# Patient Record
Sex: FEMALE | HISPANIC OR LATINO | Employment: UNEMPLOYED | URBAN - METROPOLITAN AREA
[De-identification: names, ages, dates, MRNs, and addresses within clinical notes are randomized per-mention and may not be internally consistent; named-entity substitution may affect disease eponyms.]

---

## 2021-09-02 ENCOUNTER — APPOINTMENT (OUTPATIENT)
Dept: ULTRASOUND IMAGING | Facility: HOSPITAL | Age: 26
End: 2021-09-02

## 2021-09-02 ENCOUNTER — HOSPITAL ENCOUNTER (EMERGENCY)
Facility: HOSPITAL | Age: 26
Discharge: HOME | End: 2021-09-02
Attending: EMERGENCY MEDICINE | Admitting: EMERGENCY MEDICINE

## 2021-09-02 VITALS
TEMPERATURE: 99 F | DIASTOLIC BLOOD PRESSURE: 65 MMHG | BODY MASS INDEX: 27.38 KG/M2 | OXYGEN SATURATION: 99 % | HEIGHT: 62 IN | RESPIRATION RATE: 16 BRPM | WEIGHT: 148.81 LBS | SYSTOLIC BLOOD PRESSURE: 104 MMHG | HEART RATE: 79 BPM

## 2021-09-02 DIAGNOSIS — O46.90: ICD-10-CM

## 2021-09-02 DIAGNOSIS — Z34.90 INTRAUTERINE PREGNANCY: Primary | ICD-10-CM

## 2021-09-02 LAB
ABO GROUP BLD: NORMAL
ALBUMIN SERPL-MCNC: 4.1 G/DL (ref 3.5–5.2)
ALBUMIN/GLOB SERPL: 1.3 G/DL
ALP SERPL-CCNC: 72 U/L (ref 39–117)
ALT SERPL W P-5'-P-CCNC: 7 U/L (ref 1–33)
ANION GAP SERPL CALCULATED.3IONS-SCNC: 9.7 MMOL/L (ref 5–15)
AST SERPL-CCNC: 10 U/L (ref 1–32)
BACTERIA UR QL AUTO: ABNORMAL /HPF
BASOPHILS # BLD AUTO: 0.04 10*3/MM3 (ref 0–0.2)
BASOPHILS NFR BLD AUTO: 0.4 % (ref 0–1.5)
BILIRUB SERPL-MCNC: <0.2 MG/DL (ref 0–1.2)
BILIRUB UR QL STRIP: NEGATIVE
BUN SERPL-MCNC: 10 MG/DL (ref 6–20)
BUN/CREAT SERPL: 14.7 (ref 7–25)
CALCIUM SPEC-SCNC: 9.5 MG/DL (ref 8.6–10.5)
CHLORIDE SERPL-SCNC: 102 MMOL/L (ref 98–107)
CLARITY UR: ABNORMAL
CO2 SERPL-SCNC: 23.3 MMOL/L (ref 22–29)
COLOR UR: YELLOW
CREAT SERPL-MCNC: 0.68 MG/DL (ref 0.57–1)
DEPRECATED RDW RBC AUTO: 40 FL (ref 37–54)
EOSINOPHIL # BLD AUTO: 0.09 10*3/MM3 (ref 0–0.4)
EOSINOPHIL NFR BLD AUTO: 0.9 % (ref 0.3–6.2)
ERYTHROCYTE [DISTWIDTH] IN BLOOD BY AUTOMATED COUNT: 12.8 % (ref 12.3–15.4)
GFR SERPL CREATININE-BSD FRML MDRD: 105 ML/MIN/1.73
GFR SERPL CREATININE-BSD FRML MDRD: 128 ML/MIN/1.73
GLOBULIN UR ELPH-MCNC: 3.1 GM/DL
GLUCOSE SERPL-MCNC: 107 MG/DL (ref 65–99)
GLUCOSE UR STRIP-MCNC: NEGATIVE MG/DL
HCG INTACT+B SERPL-ACNC: NORMAL MIU/ML
HCT VFR BLD AUTO: 39.8 % (ref 34–46.6)
HGB BLD-MCNC: 13.5 G/DL (ref 12–15.9)
HGB UR QL STRIP.AUTO: ABNORMAL
HYALINE CASTS UR QL AUTO: ABNORMAL /LPF
IMM GRANULOCYTES # BLD AUTO: 0.03 10*3/MM3 (ref 0–0.05)
IMM GRANULOCYTES NFR BLD AUTO: 0.3 % (ref 0–0.5)
KETONES UR QL STRIP: NEGATIVE
LEUKOCYTE ESTERASE UR QL STRIP.AUTO: ABNORMAL
LYMPHOCYTES # BLD AUTO: 2.66 10*3/MM3 (ref 0.7–3.1)
LYMPHOCYTES NFR BLD AUTO: 25.9 % (ref 19.6–45.3)
MCH RBC QN AUTO: 29.2 PG (ref 26.6–33)
MCHC RBC AUTO-ENTMCNC: 33.9 G/DL (ref 31.5–35.7)
MCV RBC AUTO: 86.1 FL (ref 79–97)
MONOCYTES # BLD AUTO: 0.55 10*3/MM3 (ref 0.1–0.9)
MONOCYTES NFR BLD AUTO: 5.4 % (ref 5–12)
NEUTROPHILS NFR BLD AUTO: 6.91 10*3/MM3 (ref 1.7–7)
NEUTROPHILS NFR BLD AUTO: 67.1 % (ref 42.7–76)
NITRITE UR QL STRIP: NEGATIVE
NRBC BLD AUTO-RTO: 0 /100 WBC (ref 0–0.2)
PH UR STRIP.AUTO: 5.5 [PH] (ref 5–8)
PLATELET # BLD AUTO: 218 10*3/MM3 (ref 140–450)
PMV BLD AUTO: 10.8 FL (ref 6–12)
POTASSIUM SERPL-SCNC: 4 MMOL/L (ref 3.5–5.2)
PROT SERPL-MCNC: 7.2 G/DL (ref 6–8.5)
PROT UR QL STRIP: NEGATIVE
RBC # BLD AUTO: 4.62 10*6/MM3 (ref 3.77–5.28)
RBC # UR: ABNORMAL /HPF
REF LAB TEST METHOD: ABNORMAL
RH BLD: POSITIVE
SODIUM SERPL-SCNC: 135 MMOL/L (ref 136–145)
SP GR UR STRIP: 1.02 (ref 1–1.03)
SQUAMOUS #/AREA URNS HPF: ABNORMAL /HPF
UROBILINOGEN UR QL STRIP: ABNORMAL
WBC # BLD AUTO: 10.28 10*3/MM3 (ref 3.4–10.8)
WBC UR QL AUTO: ABNORMAL /HPF
YEAST URNS QL MICRO: ABNORMAL /HPF

## 2021-09-02 PROCEDURE — 80053 COMPREHEN METABOLIC PANEL: CPT | Performed by: PHYSICIAN ASSISTANT

## 2021-09-02 PROCEDURE — 84702 CHORIONIC GONADOTROPIN TEST: CPT | Performed by: PHYSICIAN ASSISTANT

## 2021-09-02 PROCEDURE — 87086 URINE CULTURE/COLONY COUNT: CPT | Performed by: PHYSICIAN ASSISTANT

## 2021-09-02 PROCEDURE — 81001 URINALYSIS AUTO W/SCOPE: CPT | Performed by: PHYSICIAN ASSISTANT

## 2021-09-02 PROCEDURE — 76817 TRANSVAGINAL US OBSTETRIC: CPT

## 2021-09-02 PROCEDURE — 99283 EMERGENCY DEPT VISIT LOW MDM: CPT

## 2021-09-02 PROCEDURE — 85025 COMPLETE CBC W/AUTO DIFF WBC: CPT | Performed by: PHYSICIAN ASSISTANT

## 2021-09-02 PROCEDURE — 86901 BLOOD TYPING SEROLOGIC RH(D): CPT | Performed by: PHYSICIAN ASSISTANT

## 2021-09-02 PROCEDURE — 86900 BLOOD TYPING SEROLOGIC ABO: CPT | Performed by: PHYSICIAN ASSISTANT

## 2021-09-02 RX ORDER — PRENATAL VIT NO.126/IRON/FOLIC 28MG-0.8MG
TABLET ORAL DAILY
COMMUNITY

## 2021-09-02 NOTE — ED PROVIDER NOTES
Time: 23:53 EDT  Arrived by: Car  Chief Complaint: Vaginal bleeding  History provided by: Patient (the patient's aunt translated via phone)    History of Present Illness:  Patient is a 26 y.o. year old female that presents to the emergency department with vaginal spotting that started yesterday and has been intermittent along with right lower quadrant pain.       used: No    Pregnancy Problem  The current episode started yesterday. The problem has been unchanged. The problem occurs 2 - 4 times per day. Episode is mild. Gestational age is approximately 7 weeks.   Primary symptoms include abdominal pain and vaginal bleeding. Prenatal care has been regular.   Vaginal bleeding was first noticed today. Vaginal bleeding other than menses is a new problem. Vaginal bleeding occurred 2 to 5 times. Blood was noticed on tissue. The blood is brown. Patient has not been passing clots. Patient has not been passing tissue.            Similar Symptoms Previously: No  Recently seen: Yes, prenatal care in [place]      Patient Care Team  Primary Care Provider: None[place]    Past Medical History:     No Known Allergies  History reviewed. No pertinent past medical history.  History reviewed. No pertinent surgical history.  History reviewed. No pertinent family history.    Home Medications:  Prior to Admission medications    Medication Sig Start Date End Date Taking? Authorizing Provider   prenatal vitamin (prenatal, CLASSIC, vitamin) tablet Take  by mouth Daily.   Yes Provider, MD Benji        Social History:   PT  reports that she has never smoked. She does not have any smokeless tobacco history on file. She reports current alcohol use. No history on file for drug use.    Record Review:  I have reviewed the patient's records in [facility]     Review of Systems  Review of Systems   Gastrointestinal: Positive for abdominal pain.   Genitourinary: Positive for vaginal bleeding.   All other systems  "reviewed and are negative.       Physical Exam  /65   Pulse 79   Temp 99 °F (37.2 °C)   Resp 16   Ht 157.5 cm (62\")   Wt 67.5 kg (148 lb 13 oz)   LMP 07/09/2021   SpO2 99%   Breastfeeding No   BMI 27.22 kg/m²     Physical Exam  Vitals and nursing note reviewed.   Constitutional:       General: She is not in acute distress.     Appearance: Normal appearance. She is not toxic-appearing.   HENT:      Head: Normocephalic and atraumatic.      Mouth/Throat:      Mouth: Mucous membranes are moist.   Eyes:      Extraocular Movements: Extraocular movements intact.      Pupils: Pupils are equal, round, and reactive to light.   Cardiovascular:      Rate and Rhythm: Normal rate and regular rhythm.      Pulses: Normal pulses.      Heart sounds: Normal heart sounds.   Pulmonary:      Effort: Pulmonary effort is normal. No respiratory distress.      Breath sounds: Normal breath sounds.   Abdominal:      General: Abdomen is flat. Bowel sounds are normal.      Palpations: Abdomen is soft.      Tenderness: There is no abdominal tenderness. There is no guarding or rebound.       Musculoskeletal:         General: Normal range of motion.      Cervical back: Normal range of motion and neck supple.   Skin:     General: Skin is warm and dry.   Neurological:      Mental Status: She is alert and oriented to person, place, and time. Mental status is at baseline.                  ED Course  /65   Pulse 79   Temp 99 °F (37.2 °C)   Resp 16   Ht 157.5 cm (62\")   Wt 67.5 kg (148 lb 13 oz)   LMP 07/09/2021   SpO2 99%   Breastfeeding No   BMI 27.22 kg/m²   Results for orders placed or performed during the hospital encounter of 09/02/21   Comprehensive Metabolic Panel    Specimen: Blood   Result Value Ref Range    Glucose 107 (H) 65 - 99 mg/dL    BUN 10 6 - 20 mg/dL    Creatinine 0.68 0.57 - 1.00 mg/dL    Sodium 135 (L) 136 - 145 mmol/L    Potassium 4.0 3.5 - 5.2 mmol/L    Chloride 102 98 - 107 mmol/L    CO2 23.3 22.0 - " 29.0 mmol/L    Calcium 9.5 8.6 - 10.5 mg/dL    Total Protein 7.2 6.0 - 8.5 g/dL    Albumin 4.10 3.50 - 5.20 g/dL    ALT (SGPT) 7 1 - 33 U/L    AST (SGOT) 10 1 - 32 U/L    Alkaline Phosphatase 72 39 - 117 U/L    Total Bilirubin <0.2 0.0 - 1.2 mg/dL    eGFR Non African Amer 105 >60 mL/min/1.73    eGFR  African Amer 128 >60 mL/min/1.73    Globulin 3.1 gm/dL    A/G Ratio 1.3 g/dL    BUN/Creatinine Ratio 14.7 7.0 - 25.0    Anion Gap 9.7 5.0 - 15.0 mmol/L   Urinalysis With Culture If Indicated - Urine, Clean Catch    Specimen: Urine, Clean Catch   Result Value Ref Range    Color, UA Yellow Yellow, Straw    Appearance, UA Cloudy (A) Clear    pH, UA 5.5 5.0 - 8.0    Specific Gravity, UA 1.017 1.005 - 1.030    Glucose, UA Negative Negative    Ketones, UA Negative Negative    Bilirubin, UA Negative Negative    Blood, UA Moderate (2+) (A) Negative    Protein, UA Negative Negative    Leuk Esterase, UA Trace (A) Negative    Nitrite, UA Negative Negative    Urobilinogen, UA 0.2 E.U./dL 0.2 - 1.0 E.U./dL   hCG, Quantitative, Pregnancy    Specimen: Blood   Result Value Ref Range    HCG Quantitative 97,855.00 mIU/mL   CBC Auto Differential    Specimen: Blood   Result Value Ref Range    WBC 10.28 3.40 - 10.80 10*3/mm3    RBC 4.62 3.77 - 5.28 10*6/mm3    Hemoglobin 13.5 12.0 - 15.9 g/dL    Hematocrit 39.8 34.0 - 46.6 %    MCV 86.1 79.0 - 97.0 fL    MCH 29.2 26.6 - 33.0 pg    MCHC 33.9 31.5 - 35.7 g/dL    RDW 12.8 12.3 - 15.4 %    RDW-SD 40.0 37.0 - 54.0 fl    MPV 10.8 6.0 - 12.0 fL    Platelets 218 140 - 450 10*3/mm3    Neutrophil % 67.1 42.7 - 76.0 %    Lymphocyte % 25.9 19.6 - 45.3 %    Monocyte % 5.4 5.0 - 12.0 %    Eosinophil % 0.9 0.3 - 6.2 %    Basophil % 0.4 0.0 - 1.5 %    Immature Grans % 0.3 0.0 - 0.5 %    Neutrophils, Absolute 6.91 1.70 - 7.00 10*3/mm3    Lymphocytes, Absolute 2.66 0.70 - 3.10 10*3/mm3    Monocytes, Absolute 0.55 0.10 - 0.90 10*3/mm3    Eosinophils, Absolute 0.09 0.00 - 0.40 10*3/mm3    Basophils, Absolute  0.04 0.00 - 0.20 10*3/mm3    Immature Grans, Absolute 0.03 0.00 - 0.05 10*3/mm3    nRBC 0.0 0.0 - 0.2 /100 WBC   Urinalysis, Microscopic Only - Urine, Clean Catch    Specimen: Urine, Clean Catch   Result Value Ref Range    RBC, UA 0-2 (A) None Seen /HPF    WBC, UA 6-12 (A) None Seen /HPF    Bacteria, UA 1+ (A) None Seen /HPF    Squamous Epithelial Cells, UA 3-6 (A) None Seen, 0-2 /HPF    Yeast, UA Small/1+ Budding Yeast None Seen /HPF    Hyaline Casts, UA None Seen None Seen /LPF    Methodology Manual Light Microscopy    ABO / Rh    Specimen: Blood   Result Value Ref Range    ABO Type B     RH type Positive      Medications - No data to display  US Ob Transvaginal    Result Date: 9/2/2021  Narrative: PROCEDURE: US OB TRANSVAGINAL  COMPARISON: None  INDICATIONS: bleeding, provided LMP of 7/1/2021  TECHNIQUE: Ultrasound examination of the pelvis was performed, using endovaginal technique.   FINDINGS:  UTERUS: Contains a single intrauterine gestational sac with single embryo in yolk sac.  Embryonic heart rate is detected at 167 beats per minute.  Estimated gestational age based on ultrasound measurements is 8 weeks, 4 days for an estimated due date based on these measurements of 4/10/2022.  There is a suspected 5 mm perigestational hemorrhage. ADNEXAE: There is limited visualization of the right ovary, which measures approximately 4.4 x 2.8 x 4.2 cm.  The left ovary measures 2.1 x 1.1 x 2.1 cm.  No adnexal masses are seen. CUL-DE-SAC: No fluid or mass.   CONCLUSION:  Single intrauterine pregnancy with estimated gestational age based on ultrasound measurements of 8 weeks, 4 days, which correlates with estimated gestational age based on LMP (9 weeks, 0 days).  Embryonic heart rate detected at 167 beats per minute.  Suspected small 5 mm perigestational hemorrhage.     SHAYNE AREVALO MD       Electronically Signed and Approved By: SHAYNE AREVALO MD on 9/02/2021 at 16:46                Procedures/EKGs:  Procedures      Medical Decision Making:                     MDM  Number of Diagnoses or Management Options  Hemorrhage affecting pregnancy: new and requires workup  Intrauterine pregnancy: new and requires workup  Diagnosis management comments: The patient is pregnant and presents with vaginal bleeding. Possible etiology of vaginal bleeding were considered, but not limited to; ectopic pregnancy, spontaneous miscarriage, gestational trophoblastic disease, implantation bleeding, molar pregnancy, disfunctional uterine bleeding, and fibroids. The patient is well appearing and resting comfortably. There are no indications for transfusion. After careful consideration the patient is safe and stable to be discharged home with an outpatient OB/GYN follow-up. Patient was counseled to return to the ER or follow-up with their OB/GYN in 48 hours especially for worsening of her bleeding or increased pain. The patient has expressed a clear and thorough understanding and his agreed to follow-up as instructed.       Amount and/or Complexity of Data Reviewed  Clinical lab tests: reviewed and ordered  Tests in the radiology section of CPT®: ordered and reviewed    Risk of Complications, Morbidity, and/or Mortality  Presenting problems: low  Diagnostic procedures: low  Management options: low         Final diagnoses:   Intrauterine pregnancy   Hemorrhage affecting pregnancy        Disposition:  ED Disposition     ED Disposition Condition Comment    Discharge Stable            Felicia Espinosa, APRN  09/02/21 [id number]

## 2021-09-02 NOTE — ED NOTES
"Pt started having a \"maroon color\" when she wiped yesterday with sharp pain in RLQ which started today. Pt is two months pregnant, she has had prenatal care in [place]. Pt has recently moved to the area about 1 week ago.      Doreen Anne, RN  09/02/21 [id number]    "

## 2021-09-02 NOTE — DISCHARGE INSTRUCTIONS
Call Dr. Foss's office tomorrow to schedule an appointment for next week for follow-up.  Return if you experience worsening bleeding, specifically if you soak more than 1 pad per hour, or if your pain becomes severe.  You can take Tylenol over-the-counter for your pain.

## 2021-09-03 LAB — BACTERIA SPEC AEROBE CULT: NO GROWTH
